# Patient Record
Sex: MALE
[De-identification: names, ages, dates, MRNs, and addresses within clinical notes are randomized per-mention and may not be internally consistent; named-entity substitution may affect disease eponyms.]

---

## 2020-01-27 PROBLEM — Z00.00 ENCOUNTER FOR PREVENTIVE HEALTH EXAMINATION: Status: ACTIVE | Noted: 2020-01-27

## 2020-02-20 RX ORDER — VARDENAFIL 10 MG/1
10 TABLET, ORALLY DISINTEGRATING ORAL AS DIRECTED
Qty: 8 | Refills: 1 | Status: ACTIVE | COMMUNITY
Start: 2020-02-20 | End: 1900-01-01

## 2021-08-26 ENCOUNTER — APPOINTMENT (OUTPATIENT)
Dept: UROLOGY | Facility: CLINIC | Age: 51
End: 2021-08-26
Payer: COMMERCIAL

## 2021-08-26 ENCOUNTER — NON-APPOINTMENT (OUTPATIENT)
Age: 51
End: 2021-08-26

## 2021-08-26 VITALS
HEIGHT: 68 IN | DIASTOLIC BLOOD PRESSURE: 73 MMHG | TEMPERATURE: 97.69 F | OXYGEN SATURATION: 96 % | SYSTOLIC BLOOD PRESSURE: 107 MMHG | WEIGHT: 135 LBS | HEART RATE: 73 BPM | BODY MASS INDEX: 20.46 KG/M2

## 2021-08-26 LAB
BILIRUB UR QL STRIP: NORMAL
CLARITY UR: CLEAR
COLLECTION METHOD: NORMAL
GLUCOSE UR-MCNC: NORMAL
HCG UR QL: 0.2 EU/DL
HGB UR QL STRIP.AUTO: NORMAL
KETONES UR-MCNC: NORMAL
LEUKOCYTE ESTERASE UR QL STRIP: NORMAL
NITRITE UR QL STRIP: NORMAL
PH UR STRIP: 6
PROT UR STRIP-MCNC: NORMAL
SP GR UR STRIP: 1.01

## 2021-08-26 PROCEDURE — 99214 OFFICE O/P EST MOD 30 MIN: CPT

## 2021-08-26 RX ORDER — TADALAFIL 5 MG/1
5 TABLET ORAL
Qty: 90 | Refills: 3 | Status: ACTIVE | COMMUNITY
Start: 2021-08-26 | End: 1900-01-01

## 2021-08-26 NOTE — HISTORY OF PRESENT ILLNESS
[FreeTextEntry1] : 52 YO M seen 4/15/2019 for mild LUTS, 1 mm left renal stone, ED for which he has been taking tadalafil daily and Staxyn PRN. \par \par Patient seen TODAY 8/26/2021 to revisit his ED ans small left renal calculus. He told me that he has not had to use any tadalafil or Staxyn over most of the last year and a half and has been able to have satisfactory intercourse.  However 2 to 3 months ago he went through a 3-week period without sexual activity and felt that after this he had difficulty achieving and maintaining an erection he tried again and has had intermittent response without medication.  He would like to resume using the tadalafil and requested a refill.  The patient tells me that he is still cycling but uses a horn less seat and denies any penile anesthesia.\par \par As for urination he is voiding well although he does have some decrease in the urinary stream.  He denies any dysuria hematuria or CVA tenderness.\par UA negative\par IPSS 5\par KALEN 20\par WISAM 5\par \par Patient had colonoscopy in December 2020 which was unremarkable by history.\par \par  The patient denies fevers, chills, nausea and or vomiting and no unexplained weight loss. \par All pertinent parts of the patient PFSH (past medical, family and social histories), laboratory, radiological studies and physician notes were reviewed prior to starting the face to face portion of the  visit. Questionnaire results were discussed with patient.\par

## 2021-08-26 NOTE — ASSESSMENT
[FreeTextEntry1] : We discussed resumption of treatment of the ED with tadalafil 5 mg daily I recommended this option.  I reviewed with him proper use of the medication, indications risks alternatives and chances for success.  We will order this through his local joint and read at his request.  If the price is too expensive, then we will switch to [facility] for the medication.  We also discussed again his cycling and reviewed with him optimal methods to minimize any damage to the penile vasculature and or innervation.\par \par As for his stable small left kidney stone, this remains asymptomatic.  I did send urine for culture and cytology today and I sent blood for PSA.  If all remains stable I will see him in 1 year. Tena Wilkins MD\par

## 2021-08-26 NOTE — PHYSICAL EXAM
[General Appearance - Well Developed] : well developed [General Appearance - Well Nourished] : well nourished [Normal Appearance] : normal appearance [Well Groomed] : well groomed [General Appearance - In No Acute Distress] : no acute distress [Abdomen Soft] : soft [Abdomen Tenderness] : non-tender [Abdomen Hernia] : no hernia was discovered [Costovertebral Angle Tenderness] : no ~M costovertebral angle tenderness [Urethral Meatus] : meatus normal [Penis Abnormality] : normal circumcised penis [Epididymis] : the epididymides were normal [Testes Tenderness] : no tenderness of the testes [Testes Mass (___cm)] : there were no testicular masses [Prostate Tenderness] : the prostate was not tender [No Prostate Nodules] : no prostate nodules [Prostate Size ___ (0-4)] : prostate size [unfilled] (scale: 0-4) [Skin Turgor] : supple [Edema] : no peripheral edema [] : no respiratory distress [Oriented To Time, Place, And Person] : oriented to person, place, and time [Affect] : the affect was normal [Mood] : the mood was normal [Not Anxious] : not anxious [Normal Station and Gait] : the gait and station were normal for the patient's age [No Focal Deficits] : no focal deficits

## 2021-08-26 NOTE — LETTER BODY
[Dear  ___] : Dear  [unfilled], [Courtesy Letter:] : I had the pleasure of seeing your patient, [unfilled], in my office today. [Please see my note below.] : Please see my note below. [Consult Closing:] : Thank you very much for allowing me to participate in the care of this patient.  If you have any questions, please do not hesitate to contact me. [FreeTextEntry3] : Best Regards, \par \par Tena Wilkins MD\par

## 2021-08-27 LAB — PSA SERPL-MCNC: 0.72 NG/ML

## 2021-08-29 ENCOUNTER — TRANSCRIPTION ENCOUNTER (OUTPATIENT)
Age: 51
End: 2021-08-29

## 2021-08-29 LAB — BACTERIA UR CULT: NORMAL

## 2021-08-30 LAB — URINE CYTOLOGY: NORMAL

## 2022-04-29 ENCOUNTER — APPOINTMENT (OUTPATIENT)
Dept: UROLOGY | Facility: CLINIC | Age: 52
End: 2022-04-29

## 2022-12-23 ENCOUNTER — APPOINTMENT (OUTPATIENT)
Dept: UROLOGY | Facility: CLINIC | Age: 52
End: 2022-12-23

## 2022-12-23 VITALS
TEMPERATURE: 97.6 F | OXYGEN SATURATION: 95 % | HEART RATE: 95 BPM | DIASTOLIC BLOOD PRESSURE: 74 MMHG | SYSTOLIC BLOOD PRESSURE: 107 MMHG

## 2022-12-23 DIAGNOSIS — N20.0 CALCULUS OF KIDNEY: ICD-10-CM

## 2022-12-23 LAB
BILIRUB UR QL STRIP: NORMAL
CLARITY UR: CLEAR
COLLECTION METHOD: NORMAL
GLUCOSE UR-MCNC: NORMAL
HCG UR QL: 0.2 EU/DL
HGB UR QL STRIP.AUTO: NORMAL
KETONES UR-MCNC: NORMAL
LEUKOCYTE ESTERASE UR QL STRIP: NORMAL
NITRITE UR QL STRIP: NORMAL
PH UR STRIP: 5.5
PROT UR STRIP-MCNC: NORMAL
SP GR UR STRIP: 1.01

## 2022-12-23 PROCEDURE — 99214 OFFICE O/P EST MOD 30 MIN: CPT

## 2022-12-23 PROCEDURE — 81003 URINALYSIS AUTO W/O SCOPE: CPT | Mod: QW

## 2022-12-23 RX ORDER — TADALAFIL 5 MG/1
5 TABLET ORAL
Qty: 30 | Refills: 3 | Status: ACTIVE | COMMUNITY
Start: 2022-09-06 | End: 1900-01-01

## 2022-12-23 NOTE — HISTORY OF PRESENT ILLNESS
[FreeTextEntry1] : 53 YO M seen 4/15/2019 for mild LUTS, 1 mm left renal stone, ED for which he has been taking tadalafil daily and Staxyn PRN. \par \par Patient seen  8/26/2021 to revisit his ED and small left renal calculus. He told me that he has not had to use any tadalafil or Staxyn over most of the last year and a half and has been able to have satisfactory intercourse.  However 2 to 3 months ago he went through a 3-week period without sexual activity and felt that after this he had difficulty achieving and maintaining an erection he tried again and has had intermittent response without medication.  He would like to resume using the tadalafil and requested a refill.  The patient tells me that he is still cycling but uses a horn less seat and denies any penile anesthesia.\par Patient had colonoscopy in December 2020 which was unremarkable by history.\par \par As for urination he is voiding well although he does have some decrease in the urinary stream.  He denies any dysuria hematuria or CVA tenderness.\par UA negative\par IPSS 5\par KALEN 20\par WISAM 5\par We discussed resumption of treatment of the ED with tadalafil 5 mg daily I recommended this option. I reviewed with him proper use of the medication, indications risks alternatives and chances for success. We will order this through his local joint and read at his request. If the price is too expensive, then we will switch to [facility] for the medication. We also discussed again his cycling and reviewed with him optimal methods to minimize any damage to the penile vasculature and or innervation.\par As for his stable small left kidney stone, this remains asymptomatic. I did send urine for culture and cytology today and I sent blood for PSA. If all remains stable I will see him in 1 year. \par \par Patient seen TODAY 12/23/2022 to reassess. He told me that his urination is stable. He uses the tadalafil 5 mg daily, although he has found that e can stop for intervals when he is not likely to have intercourse and then resume as needed with good response. He requests a renewal. 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 \par UA negative\par IPSS 3\par KALEN 17\par WISAM 5\par \par Patient is presently on no oter medication than the tadalafil and an occasional Staxyn. NKMA.\par  The patient denies fevers, chills, nausea and or vomiting and no unexplained weight loss. \par All pertinent parts of the patient PFSH (past medical, family and social histories), laboratory, radiological studies and physician notes were reviewed prior to starting the face to face portion of the  visit. Questionnaire results were discussed with patient.\par

## 2022-12-23 NOTE — ASSESSMENT
[FreeTextEntry1] : We discussed the patient's adequate response to the tadalafil and this was renewed [facility] at his request.  While he has a good response also to Staxyn when necessary, he has enough and did not require refill.  He will notify me if he would like some more of this medication.\par \par As for his recent HPV removal, I confirmed no recurrence at this time.  We will continue to follow this with his yearly evaluation.  Blood was sent today for PSA.  If this remains stable follow-up in 1 year. Tena Wilkins MD\par

## 2022-12-23 NOTE — PHYSICAL EXAM
[General Appearance - Well Developed] : well developed [General Appearance - Well Nourished] : well nourished [Normal Appearance] : normal appearance [Well Groomed] : well groomed [General Appearance - In No Acute Distress] : no acute distress [Abdomen Soft] : soft [Abdomen Tenderness] : non-tender [Costovertebral Angle Tenderness] : no ~M costovertebral angle tenderness [Urethral Meatus] : meatus normal [Urinary Bladder Findings] : the bladder was normal on palpation [Scrotum] : the scrotum was normal [Epididymis] : the epididymides were normal [Testes Tenderness] : no tenderness of the testes [Testes Mass (___cm)] : there were no testicular masses [Prostate Tenderness] : the prostate was not tender [No Prostate Nodules] : no prostate nodules [Prostate Size ___ (0-4)] : prostate size [unfilled] (scale: 0-4) [FreeTextEntry1] : No penile lesions noted. [Edema] : no peripheral edema [] : no respiratory distress [Respiration, Rhythm And Depth] : normal respiratory rhythm and effort [Exaggerated Use Of Accessory Muscles For Inspiration] : no accessory muscle use [Oriented To Time, Place, And Person] : oriented to person, place, and time [Affect] : the affect was normal [Mood] : the mood was normal [Not Anxious] : not anxious [Normal Station and Gait] : the gait and station were normal for the patient's age [No Focal Deficits] : no focal deficits

## 2022-12-29 ENCOUNTER — NON-APPOINTMENT (OUTPATIENT)
Age: 52
End: 2022-12-29

## 2022-12-29 ENCOUNTER — TRANSCRIPTION ENCOUNTER (OUTPATIENT)
Age: 52
End: 2022-12-29

## 2022-12-29 LAB — PSA SERPL-MCNC: 0.75 NG/ML

## 2023-10-10 RX ORDER — TADALAFIL 5 MG/1
5 TABLET ORAL
Qty: 30 | Refills: 2 | Status: ACTIVE | COMMUNITY
Start: 2023-10-09 | End: 1900-01-01

## 2023-12-15 ENCOUNTER — APPOINTMENT (OUTPATIENT)
Dept: UROLOGY | Facility: CLINIC | Age: 53
End: 2023-12-15

## 2024-01-12 ENCOUNTER — APPOINTMENT (OUTPATIENT)
Dept: UROLOGY | Facility: CLINIC | Age: 54
End: 2024-01-12
Payer: COMMERCIAL

## 2024-01-12 DIAGNOSIS — N52.9 MALE ERECTILE DYSFUNCTION, UNSPECIFIED: ICD-10-CM

## 2024-01-12 DIAGNOSIS — A63.0 ANOGENITAL (VENEREAL) WARTS: ICD-10-CM

## 2024-01-12 LAB
BILIRUB UR QL STRIP: NORMAL
CLARITY UR: CLEAR
COLLECTION METHOD: NORMAL
GLUCOSE UR-MCNC: NORMAL
HCG UR QL: 0.2 EU/DL
HGB UR QL STRIP.AUTO: NORMAL
KETONES UR-MCNC: NORMAL
LEUKOCYTE ESTERASE UR QL STRIP: NORMAL
NITRITE UR QL STRIP: NORMAL
PH UR STRIP: 7
PROT UR STRIP-MCNC: NORMAL
SP GR UR STRIP: 1.01

## 2024-01-12 PROCEDURE — 81003 URINALYSIS AUTO W/O SCOPE: CPT | Mod: QW

## 2024-01-12 PROCEDURE — 99214 OFFICE O/P EST MOD 30 MIN: CPT

## 2024-01-12 NOTE — ASSESSMENT
[FreeTextEntry1] : We discussed evaluation today which demonstrated normal digital rectal examination and minimal lower urinary tract symptoms which are stable.  Blood was sent for PSA today we will keep in mind that the patient told me he had sexual activity only last night and this may falsely elevate the PSA value.  As for the ED, he will continue to use the tadalafil on a as needed basis and I reviewed with him appropriate dosing for as needed use of 10 to 20 mg at a time versus the 5 mg daily dose.  This medication was renewed to [facility] request.  As for the trace red and white blood cells seen in the urine today, we will send the urine for microscopic evaluation culture and cytology.  This is a new finding and if any of these tests are abnormal we will proceed with further evaluation. IF all tests return normal, then FU 1 year. Tena Wilkins MD

## 2024-01-12 NOTE — PHYSICAL EXAM
[Normal Appearance] : normal appearance [Well Groomed] : well groomed [General Appearance - In No Acute Distress] : no acute distress [Edema] : no peripheral edema [Exaggerated Use Of Accessory Muscles For Inspiration] : no accessory muscle use [Abdomen Soft] : soft [Abdomen Tenderness] : non-tender [Abdomen Mass (___ Cm)] : no abdominal mass palpated [Costovertebral Angle Tenderness] : no ~M costovertebral angle tenderness [Urethral Meatus] : meatus normal [Penis Abnormality] : normal circumcised penis [Urinary Bladder Findings] : the bladder was normal on palpation [Epididymis] : the epididymides were normal [Testes Tenderness] : no tenderness of the testes [Testes Mass (___cm)] : there were no testicular masses [Prostate Tenderness] : the prostate was not tender [No Prostate Nodules] : no prostate nodules [Prostate Size ___ (0-4)] : prostate size [unfilled] (scale: 0-4) [Normal Station and Gait] : the gait and station were normal for the patient's age [] : no rash [No Focal Deficits] : no focal deficits [Oriented To Time, Place, And Person] : oriented to person, place, and time [Affect] : the affect was normal [Mood] : the mood was normal [Not Anxious] : not anxious

## 2024-01-12 NOTE — HISTORY OF PRESENT ILLNESS
[FreeTextEntry1] : 54 YO M seen 4/15/2019 for mild LUTS, 1 mm left renal stone, ED for which he has been taking tadalafil daily and Staxyn PRN.   Patient seen  8/26/2021 to revisit his ED and small left renal calculus. He told me that he has not had to use any tadalafil or Staxyn over most of the last year and a half and has been able to have satisfactory intercourse.  However 2 to 3 months ago he went through a 3-week period without sexual activity and felt that after this he had difficulty achieving and maintaining an erection he tried again and has had intermittent response without medication.  He would like to resume using the tadalafil and requested a refill.  The patient tells me that he is still cycling but uses a horn less seat and denies any penile anesthesia. Patient had colonoscopy in December 2020 which was unremarkable by history. As for urination he is voiding well although he does have some decrease in the urinary stream.  He denies any dysuria hematuria or CVA tenderness. UA negative IPSS 5 KALEN 20 WISAM 5 We discussed resumption of treatment of the ED with tadalafil 5 mg daily I recommended this option. I reviewed with him proper use of the medication, indications risks alternatives and chances for success. We will order this through his local joint and read at his request. If the price is too expensive, then we will switch to [facility] for the medication. We also discussed again his cycling and reviewed with him optimal methods to minimize any damage to the penile vasculature and or innervation. As for his stable small left kidney stone, this remains asymptomatic. I did send urine for culture and cytology today and I sent blood for PSA. If all remains stable I will see him in 1 year.   Patient seen 12/23/2022 to reassess. He told me that his urination is stable. He uses the tadalafil 5 mg daily, although he has found that e can stop for intervals when he is not likely to have intercourse and then resume as needed with good response. He requests a renewal. 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  UA negative IPSS 3 KALEN 17 WISAM 5 Patient is presently on no oter medication than the tadalafil and an occasional Staxyn. NKMA.  The patient denies fevers, chills, nausea and or vomiting and no unexplained weight loss.  All pertinent parts of the patient PFSH (past medical, family and social histories), laboratory, radiological studies and physician notes were reviewed prior to starting the face to face portion of the  visit. Questionnaire results were discussed with patient. We discussed the patient's adequate response to the tadalafil and this was renewed at [facility] at his request. While he has a good response also to Staxyn when necessary, he has enough and did not require refill. He will notify me if he would like some more of this medication. As for his recent HPV removal, I confirmed no recurrence at this time. We will continue to follow this with his yearly evaluation. Blood was sent today for PSA. If this remains stable follow-up in 1 year. PSA 0.75  Patient seen TODAY 1/12/2024 to reassess. He has been urinating well with no dysuria or gross hematuria. He requires the tadalafil only PRN to function with respect to erections and requests a refill. He has not used any vardenafil sub lingual since last visit. He told me that he has gotten a new bike seat which is not completely hornless. We will keep that inmind when we assess the PSA and follow him for symptoms.  PAtient also had sexual activity last evening. UA trace RBC, trace WBC IPSS 3 WISAM 5 KALEN 21

## 2024-01-12 NOTE — LETTER BODY
[Dear  ___] : Dear  [unfilled], [Courtesy Letter:] : I had the pleasure of seeing your patient, [unfilled], in my office today. [Please see my note below.] : Please see my note below. [Consult Closing:] : Thank you very much for allowing me to participate in the care of this patient.  If you have any questions, please do not hesitate to contact me. [FreeTextEntry3] : Best Regards,   Tena Wilkins MD

## 2024-01-17 ENCOUNTER — NON-APPOINTMENT (OUTPATIENT)
Age: 54
End: 2024-01-17

## 2024-01-17 ENCOUNTER — TRANSCRIPTION ENCOUNTER (OUTPATIENT)
Age: 54
End: 2024-01-17

## 2024-01-17 DIAGNOSIS — R31.21 ASYMPTOMATIC MICROSCOPIC HEMATURIA: ICD-10-CM

## 2024-01-17 LAB — PSA SERPL-MCNC: 0.89 NG/ML

## 2024-01-28 LAB
APPEARANCE: CLEAR
BACTERIA UR CULT: NORMAL
BACTERIA: NEGATIVE /HPF
BILIRUBIN URINE: NEGATIVE
BLOOD URINE: NEGATIVE
CAST: 0 /LPF
COLOR: YELLOW
EPITHELIAL CELLS: 0 /HPF
GLUCOSE QUALITATIVE U: NEGATIVE MG/DL
KETONES URINE: NEGATIVE MG/DL
LEUKOCYTE ESTERASE URINE: NEGATIVE
MICROSCOPIC-UA: NORMAL
NITRITE URINE: NEGATIVE
PH URINE: 7
PROTEIN URINE: NEGATIVE MG/DL
RED BLOOD CELLS URINE: 0 /HPF
SPECIFIC GRAVITY URINE: 1.01
URINE CYTOLOGY: NORMAL
UROBILINOGEN URINE: 0.2 MG/DL
WHITE BLOOD CELLS URINE: 0 /HPF

## 2024-01-29 ENCOUNTER — NON-APPOINTMENT (OUTPATIENT)
Age: 54
End: 2024-01-29

## 2025-01-24 ENCOUNTER — NON-APPOINTMENT (OUTPATIENT)
Age: 55
End: 2025-01-24

## 2025-01-24 ENCOUNTER — APPOINTMENT (OUTPATIENT)
Dept: UROLOGY | Facility: CLINIC | Age: 55
End: 2025-01-24
Payer: COMMERCIAL

## 2025-01-24 VITALS
SYSTOLIC BLOOD PRESSURE: 121 MMHG | HEART RATE: 82 BPM | TEMPERATURE: 97.7 F | OXYGEN SATURATION: 98 % | DIASTOLIC BLOOD PRESSURE: 82 MMHG

## 2025-01-24 DIAGNOSIS — R31.21 ASYMPTOMATIC MICROSCOPIC HEMATURIA: ICD-10-CM

## 2025-01-24 DIAGNOSIS — N20.0 CALCULUS OF KIDNEY: ICD-10-CM

## 2025-01-24 DIAGNOSIS — A63.0 ANOGENITAL (VENEREAL) WARTS: ICD-10-CM

## 2025-01-24 DIAGNOSIS — N52.9 MALE ERECTILE DYSFUNCTION, UNSPECIFIED: ICD-10-CM

## 2025-01-24 PROCEDURE — 99214 OFFICE O/P EST MOD 30 MIN: CPT

## 2025-01-24 PROCEDURE — 81003 URINALYSIS AUTO W/O SCOPE: CPT | Mod: QW

## 2025-01-31 ENCOUNTER — NON-APPOINTMENT (OUTPATIENT)
Age: 55
End: 2025-01-31

## 2025-01-31 LAB
BACTERIA UR CULT: NORMAL
BILIRUB UR QL STRIP: NORMAL
CLARITY UR: CLEAR
COLLECTION METHOD: NORMAL
GLUCOSE UR-MCNC: NORMAL
HCG UR QL: 0.2 EU/DL
HGB UR QL STRIP.AUTO: NORMAL
KETONES UR-MCNC: NORMAL
LEUKOCYTE ESTERASE UR QL STRIP: NORMAL
NITRITE UR QL STRIP: NORMAL
PH UR STRIP: 5.5
PROT UR STRIP-MCNC: NORMAL
PSA SERPL-MCNC: 0.72 NG/ML
SP GR UR STRIP: 1
URINE CYTOLOGY: NORMAL